# Patient Record
(demographics unavailable — no encounter records)

---

## 2024-12-23 NOTE — HISTORY OF PRESENT ILLNESS
[FreeTextEntry1] : Ms. MARIMAR MAHARAJ 39 year - old F seen today for follow up appt s/p C/S 10/5/23 @ [place] 39 weeks and readmitted due to severe PEC. Patient carries a strong FH of HTN and DM and personal history of pp PEC.  Patient reports checking home BP 2-3 days and reports it is around 118/20. Denies any headaches or changes in vision.At the present time denies chest pain, shortness of breath, dizziness, lightheadedness, palpitations or near syncope or syncope, orthopnea, PND and increasing lower extremity edema.  In terms of activity levels, the patient reports a 30 min cardio workout video 2-3x per week and daily walks with dog. She reports challenges with diet, but endorses motivation and plan for improvement soon.   Patient was discharged home on Nifedipine ER 30 mg QD.  She is currently off antihypertensive meds.   BP today: 120/83 EKG: NSR diagnostics:  3/8/2024: TTE, Normal LV systolic function with EF 56%, no significant valvular disease 1/19/2024: Stress test: No CP, fatigue, 9 min , achieved 10 mets, 1.5 mm upslopint ST depression V3,4,5,6 Duke treadmill score +2.5, medium risk 6/05/2024: CTA wnl, Calcium score - 0   # s PP PEC : BP Trending down: off medications Encouraged Patient to monitor BP at home, keep a log, and report results back to us for evaluation. Based on the results, we will adjust medications as necessary. Additionally, encouraged a heart-healthy diet and exercise as tolerated. Encouraged patient to continue healthy exercise and eating habits, focusing on a Mediterranean style of eating and aiming for the recommended 150 minutes per week of moderate physical activity.

## 2024-12-23 NOTE — DISCUSSION/SUMMARY
[FreeTextEntry1] : Ms. MARIMAR MAHARAJ 39 year - old F seen today for follow up appt s/p C/S 10/5/23 @ [place] 39 weeks and readmitted due to severe PEC. Patient carries a strong FH of HTN and DM and personal history of pp PEC.   At the present time denies chest pain, shortness of breath, dizziness, lightheadedness, palpitations or near syncope or syncope, orthopnea, PND and increasing lower extremity edema.  Patient was discharged home on Nifedipine ER 30 mg QD.  currently off antihypertensive meds.   BP today: 120/83 EKG: wnl diagnostics:  3/8/2024: TTE, Normal LV systolic function with EF 56%, no significant valvular disease 1/19/2024: Stress test: No CP, fatigue, 9 min , achieved 10 mets, 1.5 mm upslopint ST depression V3,4,5,6 Duke treadmill score +2.5, medium risk 6/05/2024: CTA wnl, Calcium score - 0  # s PP PEC : BP Trending down: off medications. Patient reports BP at home is 118/80 and does not report any new Sx. Encouraged Patient to monitor BP at home, keep a log, and report results back to us for evaluation. Based on the results, we will adjust medications as necessary. Additionally, encouraged a heart-healthy diet and exercise as tolerated. Encouraged patient to continue healthy exercise and eating habits, focusing on a Mediterranean style of eating and aiming for the recommended 150 minutes per week of moderate physical activity.